# Patient Record
Sex: FEMALE | Race: WHITE | NOT HISPANIC OR LATINO | URBAN - METROPOLITAN AREA
[De-identification: names, ages, dates, MRNs, and addresses within clinical notes are randomized per-mention and may not be internally consistent; named-entity substitution may affect disease eponyms.]

---
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
## 2024-07-22 NOTE — PROCEDURE: PRESCRIPTION MEDICATION MANAGEMENT
Multiple attempts to reach pt and messages left with no return call.  Patient went in for HFU appt with PCP office on 7/15/24.  Encounter closing.    
NCM attempted to reach the patient to complete a TCM/HFU call. Left message to call back. [facility] provided direct contact info at [phone].     
NCM attempted to reach the patient to complete a TCM/HFU call. Left message to call back. [facility] provided direct contact info at [phone].   
Initiate Treatment: Rhofade topical cream in am
Render In Strict Bullet Format?: No
Detail Level: Zone